# Patient Record
Sex: MALE | Race: WHITE | NOT HISPANIC OR LATINO | Employment: FULL TIME
[De-identification: names, ages, dates, MRNs, and addresses within clinical notes are randomized per-mention and may not be internally consistent; named-entity substitution may affect disease eponyms.]

---

## 2017-02-24 ENCOUNTER — HOSPITAL (OUTPATIENT)
Dept: OTHER | Age: 47
End: 2017-02-24
Attending: FAMILY MEDICINE

## 2017-02-24 LAB
A/G RATIO_: 1.3
ABS LYMPH: 2 K/CUMM (ref 1–3.5)
ABS MONO: 0.7 K/CUMM (ref 0.1–0.8)
ABS NEUTRO: 4 K/CUMM (ref 2–8)
ALBUMIN: 4.1 G/DL (ref 3.5–5)
ALK PHOS: 49 UNIT/L (ref 50–124)
ALT/GPT: 37 UNIT/L (ref 0–55)
ANION GAP SERPL CALC-SCNC: 14 MEQ/L (ref 10–20)
AST/GOT: 23 UNIT/L (ref 5–34)
BASOPHIL: 1 % (ref 0–1)
BILI TOTAL: 0.7 MG/DL (ref 0.2–1)
BUN SERPL-MCNC: 15 MG/DL (ref 6–20)
CALCIUM: 9.7 MG/DL (ref 8.4–10.2)
CHLORIDE: 104 MEQ/L (ref 97–107)
CHOLESTEROL: 186 MG/DL
CREATININE: 1.25 MG/DL (ref 0.6–1.3)
DIFF_TYPE?: NORMAL
EOSINOPHIL: 1 % (ref 0–6)
GLOBULIN_: 3.1 G/DL (ref 2–4.1)
GLUCOSE LVL: 103 MG/DL (ref 70–99)
HCT VFR BLD CALC: 46 % (ref 36–51)
HDLC SERPL-MCNC: 57 MG/DL (ref 40–60)
HEMOLYSIS 1+: NEGATIVE
HEMOLYSIS 2+: NEGATIVE
HGB BLD-MCNC: 15.8 G/DL (ref 12–17)
IMMATURE GRAN: 0.1 % (ref 0–0.3)
INSTR WBC: 6.8 K/CUMM (ref 4–11)
LDLC SERPL CALC-MCNC: 114 MG/DL
LIPEMIC 3+: NEGATIVE
LYMPHOCYTE: 29 %
MCH RBC QN AUTO: 32 PG (ref 25–35)
MCHC RBC AUTO-ENTMCNC: 34 G/DL (ref 32–37)
MCV RBC AUTO: 94 FL (ref 78–97)
MONOCYTE: 10 %
NEUTROPHIL: 58 %
NRBC BLD MANUAL-RTO: 0 % (ref 0–0.2)
PLATELET: 306 K/CUMM (ref 150–450)
POTASSIUM: 4.8 MEQ/L (ref 3.5–5.1)
RBC # BLD: 4.87 M/CUMM (ref 4.2–6)
RDW: 12.4 % (ref 11.5–14.5)
SODIUM: 138 MEQ/L (ref 136–145)
TCO2: 25 MEQ/L (ref 19–29)
TOTAL PROTEIN: 7.2 G/DL (ref 6.4–8.3)
TRIGL SERPL-MCNC: 76 MG/DL
TSH SERPL-ACNC: 1.28 MIU/ML (ref 0.4–5)
UA APPEAR: CLEAR
UA BILI: NEGATIVE
UA BLOOD: NEGATIVE
UA COLOR: YELLOW
UA GLUCOSE: NEGATIVE
UA KETONES: NEGATIVE
UA LEUK EST: NEGATIVE
UA NITRITE: NEGATIVE
UA PH: 6 (ref 5–7)
UA PROTEIN: NEGATIVE
UA SPEC GRAV: <=1.005 (ref 1.01–1.02)
UA UROBILINOGEN: 0.2 MG/DL (ref 0.2–1)
VIT D 25 OH LVL: 30.3 NG/ML
WBC # BLD: 6.8 K/CUMM (ref 4–11)

## 2018-09-07 ENCOUNTER — HOSPITAL (OUTPATIENT)
Dept: OTHER | Age: 48
End: 2018-09-07
Attending: FAMILY MEDICINE

## 2018-09-07 LAB
A/G RATIO_: 1.2
ABS LYMPH: 1.9 K/CUMM (ref 1–3.5)
ABS MONO: 0.6 K/CUMM (ref 0.1–0.8)
ABS NEUTRO: 3.8 K/CUMM (ref 2–8)
ALBUMIN: 4.1 G/DL (ref 3.5–5)
ALK PHOS: 50 UNIT/L (ref 50–124)
ALT/GPT: 25 UNIT/L (ref 0–55)
ANION GAP SERPL CALC-SCNC: 15 MEQ/L (ref 10–20)
AST/GOT: 21 UNIT/L (ref 5–34)
BASOPHIL: 1 % (ref 0–1)
BILI TOTAL: 0.8 MG/DL (ref 0.2–1)
BUN SERPL-MCNC: 15 MG/DL (ref 6–20)
CALCIUM: 9.6 MG/DL (ref 8.4–10.2)
CHLORIDE: 105 MEQ/L (ref 97–107)
CHOLESTEROL: 200 MG/DL
CREATININE: 1.19 MG/DL (ref 0.6–1.3)
DIFF_TYPE?: NORMAL
EOSINOPHIL: 2 % (ref 0–6)
GLOBULIN_: 3.5 G/DL (ref 2–4.1)
GLUCOSE LVL: 104 MG/DL (ref 70–99)
HCT VFR BLD CALC: 47 % (ref 36–51)
HDLC SERPL-MCNC: 64 MG/DL (ref 40–60)
HEMOLYSIS 1+: NEGATIVE
HGB BLD-MCNC: 16.5 G/DL (ref 12–17)
IMMATURE GRAN: 0.3 % (ref 0–0.3)
INSTR WBC: 6.5 K/CUMM (ref 4–11)
LDLC SERPL CALC-MCNC: 123 MG/DL
LYMPHOCYTE: 30 %
MCH RBC QN AUTO: 33 PG (ref 25–35)
MCHC RBC AUTO-ENTMCNC: 35 G/DL (ref 32–37)
MCV RBC AUTO: 95 FL (ref 78–97)
MONOCYTE: 9 %
NEUTROPHIL: 59 %
NRBC BLD MANUAL-RTO: 0 % (ref 0–0.2)
PLATELET: 298 K/CUMM (ref 150–450)
POTASSIUM: 5.1 MEQ/L (ref 3.5–5.1)
RBC # BLD: 4.96 M/CUMM (ref 4.2–6)
RDW: 12.6 % (ref 11.5–14.5)
SODIUM: 138 MEQ/L (ref 136–145)
T3 FREE LVL: 3 PG/ML (ref 1.8–4.2)
TCO2: 23 MEQ/L (ref 19–29)
TOTAL PROTEIN: 7.6 G/DL (ref 6.4–8.3)
TRIGL SERPL-MCNC: 65 MG/DL
TSH SERPL-ACNC: 1.2 MIU/ML (ref 0.4–5)
UA APPEAR: CLEAR
UA BILI: NEGATIVE
UA BLOOD: NEGATIVE
UA COLOR: YELLOW
UA GLUCOSE: NEGATIVE
UA KETONES: NEGATIVE
UA LEUK EST: NEGATIVE
UA NITRITE: NEGATIVE
UA PH: 7.5 (ref 5–7)
UA PROTEIN: NEGATIVE
UA SPEC GRAV: 1.01 (ref 1.01–1.02)
UA UROBILINOGEN: 0.2 MG/DL (ref 0.2–1)
VIT D 25 OH LVL: 45.2 NG/ML
WBC # BLD: 6.5 K/CUMM (ref 4–11)

## 2019-02-07 ENCOUNTER — HOSPITAL (OUTPATIENT)
Dept: OTHER | Age: 49
End: 2019-02-07
Attending: PHYSICIAN ASSISTANT

## 2021-05-06 ENCOUNTER — OFFICE VISIT (OUTPATIENT)
Dept: FAMILY MEDICINE CLINIC | Facility: CLINIC | Age: 51
End: 2021-05-06
Payer: COMMERCIAL

## 2021-05-06 ENCOUNTER — LAB ENCOUNTER (OUTPATIENT)
Dept: LAB | Age: 51
End: 2021-05-06
Attending: FAMILY MEDICINE
Payer: COMMERCIAL

## 2021-05-06 VITALS
TEMPERATURE: 98 F | WEIGHT: 238 LBS | DIASTOLIC BLOOD PRESSURE: 86 MMHG | HEIGHT: 71.5 IN | RESPIRATION RATE: 16 BRPM | BODY MASS INDEX: 32.59 KG/M2 | SYSTOLIC BLOOD PRESSURE: 132 MMHG | HEART RATE: 68 BPM

## 2021-05-06 DIAGNOSIS — Z13.29 SCREENING FOR ENDOCRINE, NUTRITIONAL, METABOLIC AND IMMUNITY DISORDER: ICD-10-CM

## 2021-05-06 DIAGNOSIS — R53.83 FATIGUE, UNSPECIFIED TYPE: ICD-10-CM

## 2021-05-06 DIAGNOSIS — Z13.0 SCREENING FOR DEFICIENCY ANEMIA: ICD-10-CM

## 2021-05-06 DIAGNOSIS — R73.9 HYPERGLYCEMIA: ICD-10-CM

## 2021-05-06 DIAGNOSIS — Z13.89 SCREENING FOR GENITOURINARY CONDITION: ICD-10-CM

## 2021-05-06 DIAGNOSIS — Z12.5 SCREENING FOR PROSTATE CANCER: ICD-10-CM

## 2021-05-06 DIAGNOSIS — Z13.21 SCREENING FOR ENDOCRINE, NUTRITIONAL, METABOLIC AND IMMUNITY DISORDER: ICD-10-CM

## 2021-05-06 DIAGNOSIS — Z13.220 SCREENING FOR LIPOID DISORDERS: ICD-10-CM

## 2021-05-06 DIAGNOSIS — Z00.00 ANNUAL PHYSICAL EXAM: Primary | ICD-10-CM

## 2021-05-06 DIAGNOSIS — Z13.228 SCREENING FOR ENDOCRINE, NUTRITIONAL, METABOLIC AND IMMUNITY DISORDER: ICD-10-CM

## 2021-05-06 DIAGNOSIS — Z13.0 SCREENING FOR ENDOCRINE, NUTRITIONAL, METABOLIC AND IMMUNITY DISORDER: ICD-10-CM

## 2021-05-06 PROCEDURE — 84153 ASSAY OF PSA TOTAL: CPT | Performed by: FAMILY MEDICINE

## 2021-05-06 PROCEDURE — 84439 ASSAY OF FREE THYROXINE: CPT | Performed by: FAMILY MEDICINE

## 2021-05-06 PROCEDURE — 3079F DIAST BP 80-89 MM HG: CPT | Performed by: FAMILY MEDICINE

## 2021-05-06 PROCEDURE — 84403 ASSAY OF TOTAL TESTOSTERONE: CPT | Performed by: FAMILY MEDICINE

## 2021-05-06 PROCEDURE — 80061 LIPID PANEL: CPT | Performed by: FAMILY MEDICINE

## 2021-05-06 PROCEDURE — 3008F BODY MASS INDEX DOCD: CPT | Performed by: FAMILY MEDICINE

## 2021-05-06 PROCEDURE — 99386 PREV VISIT NEW AGE 40-64: CPT | Performed by: FAMILY MEDICINE

## 2021-05-06 PROCEDURE — 83036 HEMOGLOBIN GLYCOSYLATED A1C: CPT | Performed by: FAMILY MEDICINE

## 2021-05-06 PROCEDURE — 80053 COMPREHEN METABOLIC PANEL: CPT | Performed by: FAMILY MEDICINE

## 2021-05-06 PROCEDURE — 3075F SYST BP GE 130 - 139MM HG: CPT | Performed by: FAMILY MEDICINE

## 2021-05-06 NOTE — PROGRESS NOTES
Chloe Muro is a 45year old male who presents for a complete physical exam.   HPI:   Pt the patient is a new patient to our practice. He has been referred by a close friend. States he has been fairly healthy.   He is most concerned about a persis Used      Tobacco comment: Non-Smoker    Alcohol use: No    Drug use: Not on file     Occ: [place]. 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 Children: 3. Exercise: three times per week.   Diet: watches minimally    REVIEW OF SYSTEMS:   GENERAL: feels well otherwise  SKIN: denies an unspecified type  Screening for deficiency anemia  Screening for endocrine, nutritional, metabolic and immunity disorder  Screening for prostate cancer  Screening for genitourinary condition  Screening for lipoid disorders    Orders Placed This Encounter

## 2021-05-10 ENCOUNTER — PATIENT MESSAGE (OUTPATIENT)
Dept: FAMILY MEDICINE CLINIC | Facility: CLINIC | Age: 51
End: 2021-05-10

## 2021-05-10 DIAGNOSIS — R73.09 ELEVATED GLUCOSE: Primary | ICD-10-CM

## 2021-05-10 DIAGNOSIS — E78.00 ELEVATED CHOLESTEROL: ICD-10-CM

## 2021-05-10 NOTE — TELEPHONE ENCOUNTER
From: Darrin Ko  To: Capri Flowers MD  Sent: 5/10/2021 11:45 AM CDT  Subject: Test Results Question    Good Morning: Thank you for your follow-up note. Did you by chance notice any vitamin deficiency? Anything I should focus on?  I started ta

## 2021-12-29 ENCOUNTER — TELEPHONE (OUTPATIENT)
Dept: FAMILY MEDICINE CLINIC | Facility: CLINIC | Age: 51
End: 2021-12-29

## 2021-12-29 NOTE — TELEPHONE ENCOUNTER
S/w pt. In [place] until tomorrow. Reports sob x 3-4 weeks. No cp. Said if he bends over at the waist will get \"chest tightness\"  No swelling anywhere. No fevers. occ cough. Said he tested neg for covid 2 days ago.   I advised he should go to urge

## 2021-12-29 NOTE — TELEPHONE ENCOUNTER
1. What are your symptoms? Shortness of breath when talking - leads to coughing, cannot talk for long without leading to coughing    2. How long have you been having these symptoms? 3-4 weeks    3.  Have you done anything already to treat your symptom

## 2021-12-30 ENCOUNTER — OFFICE VISIT (OUTPATIENT)
Dept: URGENT CARE | Age: 51
End: 2021-12-30

## 2021-12-30 VITALS
HEART RATE: 71 BPM | SYSTOLIC BLOOD PRESSURE: 140 MMHG | TEMPERATURE: 97.6 F | DIASTOLIC BLOOD PRESSURE: 88 MMHG | RESPIRATION RATE: 18 BRPM | OXYGEN SATURATION: 98 %

## 2021-12-30 DIAGNOSIS — J98.01 COUGH DUE TO BRONCHOSPASM: ICD-10-CM

## 2021-12-30 DIAGNOSIS — J40 BRONCHITIS: Primary | ICD-10-CM

## 2021-12-30 PROCEDURE — 99204 OFFICE O/P NEW MOD 45 MIN: CPT | Performed by: FAMILY MEDICINE

## 2021-12-30 RX ORDER — ALBUTEROL SULFATE 90 UG/1
AEROSOL, METERED RESPIRATORY (INHALATION)
Qty: 1 EACH | Refills: 0 | Status: SHIPPED | OUTPATIENT
Start: 2021-12-30

## 2021-12-30 RX ORDER — PREDNISONE 20 MG/1
40 TABLET ORAL DAILY
Qty: 10 TABLET | Refills: 0 | Status: SHIPPED | OUTPATIENT
Start: 2021-12-30 | End: 2022-01-04

## 2021-12-30 RX ORDER — DEXTROMETHORPHAN HYDROBROMIDE AND PROMETHAZINE HYDROCHLORIDE 15; 6.25 MG/5ML; MG/5ML
5 SYRUP ORAL
Qty: 120 ML | Refills: 0 | Status: SHIPPED | OUTPATIENT
Start: 2021-12-30 | End: 2022-01-09

## 2021-12-30 RX ORDER — AZITHROMYCIN 250 MG/1
TABLET, FILM COATED ORAL
Qty: 6 TABLET | Refills: 0 | Status: SHIPPED | OUTPATIENT
Start: 2021-12-30 | End: 2022-01-04

## 2022-01-12 RX ORDER — FLUTICASONE FUROATE 100 UG/1
1 POWDER RESPIRATORY (INHALATION) DAILY
Qty: 1 EACH | Refills: 0 | Status: SHIPPED | OUTPATIENT
Start: 2022-01-12

## 2022-01-12 NOTE — TELEPHONE ENCOUNTER
Have the patient start Arnuity 100 mcg 1 puff daily for the next 2 weeks. He can use an albuterol inhaler on an as-needed basis. Have him update me in 10 days.

## 2022-01-12 NOTE — TELEPHONE ENCOUNTER
S/w pt. Reports he felt some improvement when he was on pred/abx/inhaler from urgent care in [place] (see notes below when I s/w him 12.29)    sts he feels silly calling  Said he \"can't fully kick what this is\". Said he has very sl sob  occ cough.  Not like

## 2022-01-12 NOTE — TELEPHONE ENCOUNTER
Based on symptoms described by patient, it was determined that live call be transferred to a triage nurse for further evaluation. Pt still complaining of SOB but not as bad as before. Still has cough.     Went to  end of December and pt wasa prescribed

## 2022-06-21 ENCOUNTER — TELEPHONE (OUTPATIENT)
Dept: FAMILY MEDICINE CLINIC | Facility: CLINIC | Age: 52
End: 2022-06-21

## 2022-06-21 NOTE — TELEPHONE ENCOUNTER
Pt tested positive for Covid with a home test. Pt started having congestion and tiredness on Sunday evening.

## 2022-09-27 ENCOUNTER — TELEPHONE (OUTPATIENT)
Dept: FAMILY MEDICINE CLINIC | Facility: CLINIC | Age: 52
End: 2022-09-27

## 2022-09-27 NOTE — TELEPHONE ENCOUNTER
Since he finished the prednisone he can try Flonase 2 sprays each nostril twice daily for 5 days then once daily

## 2022-09-27 NOTE — TELEPHONE ENCOUNTER
S/w pt. Advised of below. He voiced understanding. I advised if sx's do not fully resolve--be evaluated here, we haven't seen him since 5/2021. He agrees.

## 2022-09-27 NOTE — TELEPHONE ENCOUNTER
Pt requesting to speak with nurse regarding ongoing cold symptom. Pt states he is ongoing 2 weeks of cold symptoms and states main concern/remaining symptom is lingering ringing and congestion/pressure in his left ear. Pt states he feels \"something\" moving in his ear every time he coughs/sneezes. States it's probably the fluid/congestion in his ear. Pt states he went to a clinic near his house (not an [facility]) last week and was prescribed prednisone and benzonatate. Pt has finished the prednisone yesterday and had 1-2 days left of benzonatate. Pt is hoping to discuss possibly getting an additional prescription to help alleviate pressure in his ear. Please advise.

## 2022-09-27 NOTE — TELEPHONE ENCOUNTER
Per pt he went to a [facility] outside of [address] [place] [address] for URI. Everything else a little better, except his B/L ears are still pressure, no pain, decrease hearing and ringing. Also PND. Denies any fever, SOB, CP. Sxs since last Tuesday. Pt wants to know if anything he can use for his ears. Per pt he was taking a nasal spray before and denied taking any decongestant. Was given prednisone and benzonatate.

## 2022-09-27 NOTE — TELEPHONE ENCOUNTER
The prednisone is probably the best thing he can be taking right now.   I would give it a little bit more time and see how he does

## 2022-09-29 ENCOUNTER — OFFICE VISIT (OUTPATIENT)
Dept: FAMILY MEDICINE CLINIC | Facility: CLINIC | Age: 52
End: 2022-09-29

## 2022-09-29 VITALS
DIASTOLIC BLOOD PRESSURE: 78 MMHG | HEART RATE: 75 BPM | WEIGHT: 240 LBS | TEMPERATURE: 99 F | RESPIRATION RATE: 14 BRPM | HEIGHT: 71.5 IN | OXYGEN SATURATION: 98 % | BODY MASS INDEX: 32.86 KG/M2 | SYSTOLIC BLOOD PRESSURE: 142 MMHG

## 2022-09-29 DIAGNOSIS — H65.02 NON-RECURRENT ACUTE SEROUS OTITIS MEDIA OF LEFT EAR: Primary | ICD-10-CM

## 2022-09-29 PROCEDURE — 3077F SYST BP >= 140 MM HG: CPT | Performed by: FAMILY MEDICINE

## 2022-09-29 PROCEDURE — 3008F BODY MASS INDEX DOCD: CPT | Performed by: FAMILY MEDICINE

## 2022-09-29 PROCEDURE — 3078F DIAST BP <80 MM HG: CPT | Performed by: FAMILY MEDICINE

## 2022-09-29 PROCEDURE — 99213 OFFICE O/P EST LOW 20 MIN: CPT | Performed by: FAMILY MEDICINE

## 2022-09-29 RX ORDER — METHYLPREDNISOLONE 4 MG/1
TABLET ORAL
Qty: 1 EACH | Refills: 0 | Status: SHIPPED | OUTPATIENT
Start: 2022-09-29

## 2022-09-29 RX ORDER — AMOXICILLIN AND CLAVULANATE POTASSIUM 875; 125 MG/1; MG/1
1 TABLET, FILM COATED ORAL 2 TIMES DAILY
Qty: 20 TABLET | Refills: 0 | Status: SHIPPED | OUTPATIENT
Start: 2022-09-29 | End: 2022-10-09

## 2022-09-29 NOTE — TELEPHONE ENCOUNTER
Live call from pt. Reports ear still feels blocked. Does not feel the Flonase is helping at all. Feels his hearing is being impacted. I suggested [facility] today for further evaluation as all providers full until monday. He agrees. Info provided for [email or web address] for locations. He agrees.

## 2022-10-11 ENCOUNTER — TELEPHONE (OUTPATIENT)
Dept: FAMILY MEDICINE CLINIC | Facility: CLINIC | Age: 52
End: 2022-10-11

## 2022-10-11 NOTE — TELEPHONE ENCOUNTER
Called Dr. Lauren Coronado office and confirmed that he can see patient for hearing loss. Pt informed.

## 2022-10-11 NOTE — TELEPHONE ENCOUNTER
Pt scheduled an appt w/Dr. Jun Elias on 10/24 for hearing loss. Wants to make sure this is the correct specialist to see for hearing loss. Physical scheduled for 12/19.

## 2022-10-24 ENCOUNTER — OFFICE VISIT (OUTPATIENT)
Facility: LOCATION | Age: 52
End: 2022-10-24
Payer: COMMERCIAL

## 2022-10-24 DIAGNOSIS — H90.3 SENSORINEURAL HEARING LOSS (SNHL) OF BOTH EARS: Primary | ICD-10-CM

## 2022-10-24 PROCEDURE — 92557 COMPREHENSIVE HEARING TEST: CPT | Performed by: AUDIOLOGIST

## 2022-10-24 PROCEDURE — 92567 TYMPANOMETRY: CPT | Performed by: AUDIOLOGIST

## 2022-10-24 PROCEDURE — 99204 OFFICE O/P NEW MOD 45 MIN: CPT | Performed by: OTOLARYNGOLOGY

## 2022-10-24 NOTE — PROGRESS NOTES
Kennedy Robles was seen for an audiometric evaluation and tympanogram today. Referred back to physician. Recommend demo/trial with hearing aids.     Rose Medina MS, CCC-A

## 2022-12-19 ENCOUNTER — OFFICE VISIT (OUTPATIENT)
Dept: FAMILY MEDICINE CLINIC | Facility: CLINIC | Age: 52
End: 2022-12-19
Payer: COMMERCIAL

## 2022-12-19 VITALS
HEIGHT: 71.5 IN | BODY MASS INDEX: 34.51 KG/M2 | DIASTOLIC BLOOD PRESSURE: 92 MMHG | RESPIRATION RATE: 16 BRPM | SYSTOLIC BLOOD PRESSURE: 146 MMHG | WEIGHT: 252 LBS | TEMPERATURE: 98 F | HEART RATE: 76 BPM

## 2022-12-19 DIAGNOSIS — Z13.21 SCREENING FOR ENDOCRINE, NUTRITIONAL, METABOLIC AND IMMUNITY DISORDER: ICD-10-CM

## 2022-12-19 DIAGNOSIS — Z13.89 SCREENING FOR GENITOURINARY CONDITION: ICD-10-CM

## 2022-12-19 DIAGNOSIS — Z12.11 SCREENING FOR COLON CANCER: ICD-10-CM

## 2022-12-19 DIAGNOSIS — Z13.29 SCREENING FOR ENDOCRINE, NUTRITIONAL, METABOLIC AND IMMUNITY DISORDER: ICD-10-CM

## 2022-12-19 DIAGNOSIS — Z00.00 ANNUAL PHYSICAL EXAM: Primary | ICD-10-CM

## 2022-12-19 DIAGNOSIS — Z13.0 SCREENING FOR DEFICIENCY ANEMIA: ICD-10-CM

## 2022-12-19 DIAGNOSIS — Z13.220 SCREENING FOR LIPOID DISORDERS: ICD-10-CM

## 2022-12-19 DIAGNOSIS — Z12.5 SCREENING FOR PROSTATE CANCER: ICD-10-CM

## 2022-12-19 DIAGNOSIS — Z13.228 SCREENING FOR ENDOCRINE, NUTRITIONAL, METABOLIC AND IMMUNITY DISORDER: ICD-10-CM

## 2022-12-19 DIAGNOSIS — Z13.0 SCREENING FOR ENDOCRINE, NUTRITIONAL, METABOLIC AND IMMUNITY DISORDER: ICD-10-CM

## 2022-12-19 PROCEDURE — 3077F SYST BP >= 140 MM HG: CPT | Performed by: FAMILY MEDICINE

## 2022-12-19 PROCEDURE — 3080F DIAST BP >= 90 MM HG: CPT | Performed by: FAMILY MEDICINE

## 2022-12-19 PROCEDURE — 99396 PREV VISIT EST AGE 40-64: CPT | Performed by: FAMILY MEDICINE

## 2022-12-19 PROCEDURE — 3008F BODY MASS INDEX DOCD: CPT | Performed by: FAMILY MEDICINE

## 2023-01-10 ENCOUNTER — TELEPHONE (OUTPATIENT)
Dept: FAMILY MEDICINE CLINIC | Facility: CLINIC | Age: 53
End: 2023-01-10

## 2023-01-10 DIAGNOSIS — M25.561 RIGHT KNEE PAIN, UNSPECIFIED CHRONICITY: ICD-10-CM

## 2023-01-10 DIAGNOSIS — M25.551 RIGHT HIP PAIN: Primary | ICD-10-CM

## 2023-01-10 NOTE — TELEPHONE ENCOUNTER
S/W pt and he states he is fine going through [facility] PT.  But will call his insurance for [employer] if need to.

## 2023-01-10 NOTE — TELEPHONE ENCOUNTER
Pt is looking for a referral for a physical therapy. Pt spoke with Dr Immanuel Sims at his last [address] 12/19 about physical therapy and a chiropractor. Pt currently is going to a chiropractor but is looking for a physical therapist.    Pt is preferring [facility] physical therapy near [facility] if possible.     Please advise

## 2023-01-12 ENCOUNTER — TELEPHONE (OUTPATIENT)
Dept: FAMILY MEDICINE CLINIC | Facility: CLINIC | Age: 53
End: 2023-01-12

## 2023-01-12 RX ORDER — METHYLPREDNISOLONE 4 MG/1
TABLET ORAL
Qty: 21 TABLET | Refills: 0 | Status: SHIPPED | OUTPATIENT
Start: 2023-01-12

## 2023-01-12 NOTE — TELEPHONE ENCOUNTER
.  Most insurance plans require a course of physical therapy before they will  okay an MRI.   Patient could try a Medrol Dosepak

## 2023-01-12 NOTE — TELEPHONE ENCOUNTER
Pt called back and informed of Dr. Miguelina Simms recommendation below and he voiced understanding. Medrol Dose Alcon sent to his [facility].

## 2023-01-12 NOTE — TELEPHONE ENCOUNTER
S/w Consuelo Luo     Pt inquiring about PT referral which was entered on 1/10/23 but pending insurance authorization. Pt has pain behind right knee and right buttocks/hip. Pt also inquiring if should get MRI in addition to PT? Pt stated can only tolerate sitting for 20 minutes at a time he has flights for work going to [place] and back to [facility]. 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 Informed of signs and symptoms to go to the ED. Pt is inquiring if there is anything he can take to tolerate this trip on 1/16/23 ? no rashes

## 2023-01-12 NOTE — TELEPHONE ENCOUNTER
Pt requesting to speak with nurse regarding ongoing discussion about getting PT referral. Pt states he has not yet received notification of referral being placed. I did advised pt it appears referral is in system however not yet authorized by insurance. Pt also looking to discuss upcoming travel next week. 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 He is hoping to discuss recommendations as to how to travel comfortably. Please contact to discuss.

## 2023-01-24 ENCOUNTER — TELEPHONE (OUTPATIENT)
Dept: PHYSICAL THERAPY | Facility: HOSPITAL | Age: 53
End: 2023-01-24

## 2023-01-24 ENCOUNTER — OFFICE VISIT (OUTPATIENT)
Dept: PHYSICAL THERAPY | Age: 53
End: 2023-01-24
Attending: FAMILY MEDICINE
Payer: COMMERCIAL

## 2023-01-24 DIAGNOSIS — M25.561 RIGHT KNEE PAIN, UNSPECIFIED CHRONICITY: ICD-10-CM

## 2023-01-24 DIAGNOSIS — M25.551 RIGHT HIP PAIN: ICD-10-CM

## 2023-01-24 PROCEDURE — 97110 THERAPEUTIC EXERCISES: CPT

## 2023-01-24 PROCEDURE — 97161 PT EVAL LOW COMPLEX 20 MIN: CPT

## 2023-01-30 ENCOUNTER — OFFICE VISIT (OUTPATIENT)
Dept: PHYSICAL THERAPY | Age: 53
End: 2023-01-30
Attending: FAMILY MEDICINE
Payer: COMMERCIAL

## 2023-01-30 PROCEDURE — 97110 THERAPEUTIC EXERCISES: CPT

## 2023-02-02 ENCOUNTER — OFFICE VISIT (OUTPATIENT)
Dept: PHYSICAL THERAPY | Age: 53
End: 2023-02-02
Attending: FAMILY MEDICINE
Payer: COMMERCIAL

## 2023-02-02 PROCEDURE — 97140 MANUAL THERAPY 1/> REGIONS: CPT

## 2023-02-02 PROCEDURE — 97110 THERAPEUTIC EXERCISES: CPT

## 2023-02-06 ENCOUNTER — APPOINTMENT (OUTPATIENT)
Dept: PHYSICAL THERAPY | Age: 53
End: 2023-02-06
Attending: FAMILY MEDICINE
Payer: COMMERCIAL

## 2023-02-13 ENCOUNTER — OFFICE VISIT (OUTPATIENT)
Dept: PHYSICAL THERAPY | Age: 53
End: 2023-02-13
Attending: FAMILY MEDICINE
Payer: COMMERCIAL

## 2023-02-13 PROCEDURE — 97110 THERAPEUTIC EXERCISES: CPT

## 2023-02-13 PROCEDURE — 97530 THERAPEUTIC ACTIVITIES: CPT

## 2023-02-15 ENCOUNTER — OFFICE VISIT (OUTPATIENT)
Dept: PHYSICAL THERAPY | Age: 53
End: 2023-02-15
Attending: FAMILY MEDICINE
Payer: COMMERCIAL

## 2023-02-15 PROCEDURE — 97110 THERAPEUTIC EXERCISES: CPT

## 2023-02-20 ENCOUNTER — APPOINTMENT (OUTPATIENT)
Dept: PHYSICAL THERAPY | Age: 53
End: 2023-02-20
Attending: FAMILY MEDICINE
Payer: COMMERCIAL

## 2023-02-20 ENCOUNTER — OFFICE VISIT (OUTPATIENT)
Dept: PHYSICAL THERAPY | Age: 53
End: 2023-02-20
Attending: FAMILY MEDICINE
Payer: COMMERCIAL

## 2023-02-20 PROCEDURE — 97140 MANUAL THERAPY 1/> REGIONS: CPT

## 2023-02-20 PROCEDURE — 97110 THERAPEUTIC EXERCISES: CPT

## 2023-02-22 ENCOUNTER — OFFICE VISIT (OUTPATIENT)
Dept: PHYSICAL THERAPY | Age: 53
End: 2023-02-22
Attending: FAMILY MEDICINE
Payer: COMMERCIAL

## 2023-02-22 PROCEDURE — 97140 MANUAL THERAPY 1/> REGIONS: CPT

## 2023-02-22 PROCEDURE — 97110 THERAPEUTIC EXERCISES: CPT

## 2023-02-27 ENCOUNTER — OFFICE VISIT (OUTPATIENT)
Dept: PHYSICAL THERAPY | Age: 53
End: 2023-02-27
Attending: FAMILY MEDICINE
Payer: COMMERCIAL

## 2023-02-27 PROCEDURE — 97140 MANUAL THERAPY 1/> REGIONS: CPT

## 2023-02-27 PROCEDURE — 97110 THERAPEUTIC EXERCISES: CPT

## 2023-05-10 ENCOUNTER — OFFICE VISIT (OUTPATIENT)
Dept: FAMILY MEDICINE CLINIC | Facility: CLINIC | Age: 53
End: 2023-05-10
Payer: COMMERCIAL

## 2023-05-10 VITALS
OXYGEN SATURATION: 97 % | TEMPERATURE: 98 F | SYSTOLIC BLOOD PRESSURE: 168 MMHG | HEART RATE: 82 BPM | RESPIRATION RATE: 18 BRPM | DIASTOLIC BLOOD PRESSURE: 90 MMHG

## 2023-05-10 DIAGNOSIS — R03.0 ELEVATED BLOOD PRESSURE READING: ICD-10-CM

## 2023-05-10 DIAGNOSIS — H92.02 LEFT EAR PAIN: Primary | ICD-10-CM

## 2023-05-10 PROCEDURE — 3080F DIAST BP >= 90 MM HG: CPT | Performed by: NURSE PRACTITIONER

## 2023-05-10 PROCEDURE — 99213 OFFICE O/P EST LOW 20 MIN: CPT | Performed by: NURSE PRACTITIONER

## 2023-05-10 PROCEDURE — 3077F SYST BP >= 140 MM HG: CPT | Performed by: NURSE PRACTITIONER

## 2023-05-30 ENCOUNTER — TELEPHONE (OUTPATIENT)
Dept: FAMILY MEDICINE CLINIC | Facility: CLINIC | Age: 53
End: 2023-05-30

## 2023-05-30 NOTE — TELEPHONE ENCOUNTER
I called and spoke to pt. He has had elevated blood pressure readings recently. 12/19/2022 in office reading was 152/100 repeat in office was 146/92  [facility] 05/10/2023 168/90  Today 160/95. He denies any chest pain or pressure. No headache or vision changes. He does occasionally have some slight shortness of breath with exertion. He is a non smoker. Family hx dad MI at approx age 77. Dad HTN. Mom hx of elevated cholesterol. Pt is calling today to make an appt for an [facility].  Please advise

## 2023-05-30 NOTE — TELEPHONE ENCOUNTER
Sounds like patient needs a hypertension visit. If I have an opening I would be happy to see him if not okay to schedule with whomever has an opening.   Does he need the number to schedule the U?  S

## 2023-05-30 NOTE — TELEPHONE ENCOUNTER
I called and spoke to pt. I made him an appt to see MICHELLE Hawkins PA-C on 06/01 at 11:00 AM. Pt is aware if he develops any shortness of breath, chest pressure, vision changes, severe headache to go to the ER.  Pt. Agreed to plan and verbalized understanding

## 2023-05-30 NOTE — TELEPHONE ENCOUNTER
Pt requesting to s/w nurse regarding high BP.    LOV w/ Dr Shen Griggs - 152/100  [facility] 5/10 - 168/90  At home 5/30 - 160/95    Pt concerned as BP does not seem to be lowering on its own, requesting to discuss w/ nurse. Pt agrees to c/b w/ new / worsening symptoms and/or higher BP readings.

## 2023-06-01 ENCOUNTER — OFFICE VISIT (OUTPATIENT)
Dept: FAMILY MEDICINE CLINIC | Facility: CLINIC | Age: 53
End: 2023-06-01
Payer: COMMERCIAL

## 2023-06-01 VITALS
SYSTOLIC BLOOD PRESSURE: 142 MMHG | BODY MASS INDEX: 34.44 KG/M2 | HEART RATE: 80 BPM | DIASTOLIC BLOOD PRESSURE: 78 MMHG | TEMPERATURE: 97 F | RESPIRATION RATE: 16 BRPM | WEIGHT: 246 LBS | HEIGHT: 71 IN

## 2023-06-01 DIAGNOSIS — I10 UNCONTROLLED HYPERTENSION: Primary | ICD-10-CM

## 2023-06-01 PROCEDURE — 99214 OFFICE O/P EST MOD 30 MIN: CPT | Performed by: FAMILY MEDICINE

## 2023-06-01 PROCEDURE — 3077F SYST BP >= 140 MM HG: CPT | Performed by: FAMILY MEDICINE

## 2023-06-01 PROCEDURE — 3078F DIAST BP <80 MM HG: CPT | Performed by: FAMILY MEDICINE

## 2023-06-01 PROCEDURE — 3008F BODY MASS INDEX DOCD: CPT | Performed by: FAMILY MEDICINE

## 2023-06-01 RX ORDER — VALSARTAN 80 MG/1
80 TABLET ORAL DAILY
Qty: 30 TABLET | Refills: 1 | Status: SHIPPED | OUTPATIENT
Start: 2023-06-01

## 2023-06-01 NOTE — PATIENT INSTRUCTIONS
Check fasting labs from Dr. Jose Amezquita. Check BP every other day starting in about 4 weeks. Follow up in 6 weeks. Bring readings and monitor to the appointment.

## 2023-07-13 ENCOUNTER — OFFICE VISIT (OUTPATIENT)
Dept: FAMILY MEDICINE CLINIC | Facility: CLINIC | Age: 53
End: 2023-07-13
Payer: COMMERCIAL

## 2023-07-13 VITALS
HEART RATE: 64 BPM | SYSTOLIC BLOOD PRESSURE: 118 MMHG | HEIGHT: 71 IN | DIASTOLIC BLOOD PRESSURE: 74 MMHG | BODY MASS INDEX: 33.88 KG/M2 | WEIGHT: 242 LBS | RESPIRATION RATE: 12 BRPM

## 2023-07-13 DIAGNOSIS — Z12.12 SCREENING FOR COLORECTAL CANCER: ICD-10-CM

## 2023-07-13 DIAGNOSIS — Z12.11 SCREENING FOR COLORECTAL CANCER: ICD-10-CM

## 2023-07-13 DIAGNOSIS — I10 BENIGN ESSENTIAL HYPERTENSION: Primary | ICD-10-CM

## 2023-07-13 PROCEDURE — 3008F BODY MASS INDEX DOCD: CPT | Performed by: FAMILY MEDICINE

## 2023-07-13 PROCEDURE — 3078F DIAST BP <80 MM HG: CPT | Performed by: FAMILY MEDICINE

## 2023-07-13 PROCEDURE — 99213 OFFICE O/P EST LOW 20 MIN: CPT | Performed by: FAMILY MEDICINE

## 2023-07-13 PROCEDURE — 3074F SYST BP LT 130 MM HG: CPT | Performed by: FAMILY MEDICINE

## 2023-07-13 RX ORDER — VALSARTAN 80 MG/1
80 TABLET ORAL DAILY
Qty: 90 TABLET | Refills: 1 | Status: SHIPPED | OUTPATIENT
Start: 2023-07-13

## 2023-08-30 ENCOUNTER — PATIENT MESSAGE (OUTPATIENT)
Dept: FAMILY MEDICINE CLINIC | Facility: CLINIC | Age: 53
End: 2023-08-30

## 2023-08-30 RX ORDER — VALSARTAN 80 MG/1
80 TABLET ORAL DAILY
Qty: 90 TABLET | Refills: 1 | Status: CANCELLED | OUTPATIENT
Start: 2023-08-30

## 2023-08-31 RX ORDER — VALSARTAN 80 MG/1
80 TABLET ORAL DAILY
Qty: 90 TABLET | Refills: 1 | Status: SHIPPED | OUTPATIENT
Start: 2023-08-31

## 2023-10-02 ENCOUNTER — HOSPITAL ENCOUNTER (OUTPATIENT)
Age: 53
Discharge: HOME | End: 2023-10-02
Payer: COMMERCIAL

## 2023-10-02 VITALS
DIASTOLIC BLOOD PRESSURE: 85 MMHG | SYSTOLIC BLOOD PRESSURE: 131 MMHG | TEMPERATURE: 100 F | HEART RATE: 104 BPM | OXYGEN SATURATION: 96 % | RESPIRATION RATE: 16 BRPM

## 2023-10-02 DIAGNOSIS — R05.1 ACUTE COUGH: Primary | ICD-10-CM

## 2023-10-02 DIAGNOSIS — J06.9 UPPER RESPIRATORY TRACT INFECTION, UNSPECIFIED TYPE: ICD-10-CM

## 2023-10-02 LAB — SARS-COV-2 RNA RESP QL NAA+PROBE: NOT DETECTED

## 2023-10-02 PROCEDURE — 99213 OFFICE O/P EST LOW 20 MIN: CPT | Performed by: NURSE PRACTITIONER

## 2023-10-02 PROCEDURE — U0002 COVID-19 LAB TEST NON-CDC: HCPCS | Performed by: NURSE PRACTITIONER

## 2023-10-02 RX ORDER — BENZONATATE 100 MG/1
100 CAPSULE ORAL 3 TIMES DAILY PRN
Qty: 30 CAPSULE | Refills: 0 | Status: SHIPPED | OUTPATIENT
Start: 2023-10-02 | End: 2023-11-01

## 2023-10-02 RX ORDER — IBUPROFEN 600 MG/1
600 TABLET ORAL ONCE
Status: COMPLETED | OUTPATIENT
Start: 2023-10-02 | End: 2023-10-02

## 2023-10-02 NOTE — DISCHARGE INSTRUCTIONS
-Acetaminophen  1000mg orally every 4 hours as needed for fever or pain. Do not combine with other products containing acetaminophen.      -Ibuprofen 600mg orally every 6 hours as needed for fever or pain. Take with food. Discontinue use and seek medical attention with blood in vomit or stool, coffee ground vomit, or dark black stool.     -Encourage fluids.     -Use a cool air humidifier as needed. -May administer 5 mL of organic honey as needed for cough in patients greater than 1 year of age. Do not administer over-the-counter cough and cold medications without discussing with your physician or the pharmacist as they may not be safe in children.     -Follow-up with a primary care provider within 3 days if fever persists or sooner if necessary.     -Please monitor for and seek medical attention with persistent fever, difficulties breathing, increased work of breathing, abdominal pain, persistent vomiting with concerns for dehydration, or any other concerns.

## 2023-10-13 ENCOUNTER — TELEPHONE (OUTPATIENT)
Dept: FAMILY MEDICINE CLINIC | Facility: CLINIC | Age: 53
End: 2023-10-13

## 2023-10-13 NOTE — TELEPHONE ENCOUNTER
1. What are your symptoms? Mostly just cough and congestion  Spitting up mucus  SOB  When trying to breathe out it promotes cough    Tested negative for COVID on 10/2 at [facility].  unable to perform flu test due to them being out of stock. 2. How long have you been having these symptoms? Monday, 10/02    3. Have you done anything already to treat your symptoms? Aleve cold and flu  Cough suppressant     ADDITIONAL INFO:     Last Friday- started feeling better. Went out and watched hs football game when raining and cold and seemes to set pt back. Week after the game, started to feel a little better but expereincing congestion and mucus.

## 2023-10-13 NOTE — TELEPHONE ENCOUNTER
S/w pt. No wheezing  Reports coughing fits when speaking  Feels he is on tail end but feels the cough is still lingering. Appt given for Monday  Advised if sx's worsen prior--urgent care this weekend.   He voiced understanding

## 2023-10-16 ENCOUNTER — OFFICE VISIT (OUTPATIENT)
Dept: FAMILY MEDICINE CLINIC | Facility: CLINIC | Age: 53
End: 2023-10-16
Payer: COMMERCIAL

## 2023-10-16 VITALS
SYSTOLIC BLOOD PRESSURE: 130 MMHG | DIASTOLIC BLOOD PRESSURE: 84 MMHG | BODY MASS INDEX: 33.32 KG/M2 | OXYGEN SATURATION: 95 % | HEART RATE: 80 BPM | HEIGHT: 71 IN | TEMPERATURE: 97 F | WEIGHT: 238 LBS | RESPIRATION RATE: 12 BRPM

## 2023-10-16 DIAGNOSIS — J06.9 ACUTE URI: ICD-10-CM

## 2023-10-16 DIAGNOSIS — R05.1 ACUTE COUGH: Primary | ICD-10-CM

## 2023-10-16 PROCEDURE — 3079F DIAST BP 80-89 MM HG: CPT | Performed by: FAMILY MEDICINE

## 2023-10-16 PROCEDURE — 99213 OFFICE O/P EST LOW 20 MIN: CPT | Performed by: FAMILY MEDICINE

## 2023-10-16 PROCEDURE — 3008F BODY MASS INDEX DOCD: CPT | Performed by: FAMILY MEDICINE

## 2023-10-16 PROCEDURE — 3075F SYST BP GE 130 - 139MM HG: CPT | Performed by: FAMILY MEDICINE

## 2023-10-16 RX ORDER — ALBUTEROL SULFATE 90 UG/1
AEROSOL, METERED RESPIRATORY (INHALATION)
Qty: 1 EACH | Refills: 0 | Status: SHIPPED | OUTPATIENT
Start: 2023-10-16

## 2023-10-16 RX ORDER — AZITHROMYCIN 250 MG/1
TABLET, FILM COATED ORAL
Qty: 6 TABLET | Refills: 0 | Status: SHIPPED | OUTPATIENT
Start: 2023-10-16 | End: 2023-10-20

## 2024-05-01 ENCOUNTER — OFFICE VISIT (OUTPATIENT)
Dept: FAMILY MEDICINE CLINIC | Facility: CLINIC | Age: 54
End: 2024-05-01
Payer: COMMERCIAL

## 2024-05-01 VITALS
HEIGHT: 71 IN | DIASTOLIC BLOOD PRESSURE: 72 MMHG | TEMPERATURE: 98 F | RESPIRATION RATE: 18 BRPM | HEART RATE: 74 BPM | WEIGHT: 245 LBS | BODY MASS INDEX: 34.3 KG/M2 | SYSTOLIC BLOOD PRESSURE: 118 MMHG

## 2024-05-01 DIAGNOSIS — Z12.11 SCREEN FOR COLON CANCER: ICD-10-CM

## 2024-05-01 DIAGNOSIS — Z00.00 HEALTH MAINTENANCE EXAMINATION: Primary | ICD-10-CM

## 2024-05-01 DIAGNOSIS — Z12.5 SCREENING FOR MALIGNANT NEOPLASM OF PROSTATE: ICD-10-CM

## 2024-05-01 PROCEDURE — 3074F SYST BP LT 130 MM HG: CPT | Performed by: FAMILY MEDICINE

## 2024-05-01 PROCEDURE — 3078F DIAST BP <80 MM HG: CPT | Performed by: FAMILY MEDICINE

## 2024-05-01 PROCEDURE — 99396 PREV VISIT EST AGE 40-64: CPT | Performed by: FAMILY MEDICINE

## 2024-05-01 PROCEDURE — 3008F BODY MASS INDEX DOCD: CPT | Performed by: FAMILY MEDICINE

## 2024-05-01 NOTE — PROGRESS NOTES
[facility] Family Medicine Office Note  Chief Complaint:   Chief Complaint   Patient presents with    Physical       HPI:   This is a 53 year old male coming in for physical exam.  The patient states that he has been doing well denies any chest pain nausea vomiting diarrhea constipation.      Past Medical History:    Abdominal pain, unspecified site    Acute bronchitis    Acute bronchospasm    Acute pharyngitis    Acute sinusitis, unspecified    Bronchitis, not specified as acute or chronic    Bronchospasm    Chalazion    Diarrhea    Dysfunction of eustachian tube    Hemorrhage of rectum and anus    Lumbago    Molluscum contagiosum    Myocarditis (HCC)    Otalgia, unspecified    Unspecified hearing loss     Past Surgical History:   Procedure Laterality Date    Colonoscopy  2011    Other surgical history  9/9/13    [facility]/- Dr. Jernigan      Social History:  Social History     Socioeconomic History    Marital status: Single   Tobacco Use    Smoking status: Former     Types: Cigars    Smokeless tobacco: Never    Tobacco comments:     Non-Smoker   Vaping Use    Vaping status: Never Used   Substance and Sexual Activity    Alcohol use: Yes     Comment: occasiolnally    Drug use: Never    Sexual activity: Yes     Partners: Female   Other Topics Concern    Exercise Yes     Comment: occasional walk     Family History:  No family history on file.  Allergies:  No Known Allergies  Current Meds:  Current Outpatient Medications   Medication Sig Dispense Refill    valsartan 80 MG Oral Tab Take 1 tablet (80 mg total) by mouth daily. 90 tablet 1      Counseling given: Not Answered  Tobacco comments: Non-Smoker       REVIEW OF SYSTEMS:   Consitutional: No fevers, chills, sweats  Eye: No recent visual problems  ENMT: No ear pain nasal congestion sore throat  Respiratory: No shortness of breath, cough  Cardiovascular: No chest pain palpitations syncope  Gastrointestinal: No nausea vomiting diarrhea  Genitourinary: No  hematuria  Hema/Lymph no bruising tendency, swollen lymph glands  Endocrine: Negative for excessive thirst excessive hunger  Musculoskeletal: No back pain neck pain joint pain muscle pain decreased range of motion  Integumentary: No rash, pruritus, abrasions  Neurologic: Alert and oriented x4  Psychiatric: No anxiety, depression    Medical, surgical, family, and social histories were reviewed      EXAM:   VITALS:   Vitals:    05/01/24 0835   BP: 118/72   Pulse: 74   Resp: 18   Temp: 97.6 °F (36.4 °C)      GENERAL: well developed, well nourished, in no apparent distress  SKIN: no rashes, no suspicious lesions: Cool and Dry  HEENT: atraumatic, normocephalic, ears and throat are clear    Ears: TM's clear and visible bilaterally, no excess cerumen or erythema.   EYES: Pupils equal round and reactive.  Extraocular motions intact no scleral icterus no injection or drainage  THROAT without erythema tonsillar hypertrophy or exudate.  Uvula midline airway patent  NECK: Given midline.  No JVD or lymphadenopathy supple nontender no meningeal signs   LUNGS: clear to auscultation sounds equal bilaterally no wheezes rales or rhonchi  CARDIO: Regular rate and rhythm without murmurs gallops or rubs  GI: Soft nontender nondistended no hepatomegaly palpable masses.  No guarding.   without deformity or crepitus no flank tenderness  EXTREMITIES: no cyanosis, clubbing or edema no joint tenderness effusion or edema noted.  No calf tenderness negative Homans' sign bilaterally  NEURO: Awake and alert.  Cranial nerves II through XII intact motor and sensory grossly within normal limits.  5 out of 5 muscle strength in all muscle groups.  Normal speech.  PSYCHIATRIC: Awake, alert and oriented x3, cooperative appropriate mood and affect.  Judgment intact       ASSESSMENT AND PLAN:   1. Health maintenance examination  - CBC With Differential With Platelet; Future  - Comp Metabolic Panel (14); Future  - Lipid Panel; Future  - TSH W  Reflex To Free T4; Future  I did discuss with the patient at this time would suggest updating blood work you need a fasting lipid panel CBC CMP free T4 free T3 TSH as well as PSA.  We did also discuss for him to update a screening colonoscopy.  A referral was placed for this.  He was asked to follow-up yearly for regular physical exams or for any other acute concern.  2. Screening for malignant neoplasm of prostate  - PSA Total, Screen; Future    3. Screen for colon cancer  - Gastro Referral - In Network       Meds & Refills for this Visit:  Requested Prescriptions      No prescriptions requested or ordered in this encounter       Health Maintenance:  Health Maintenance Due   Topic Date Due    Colorectal Cancer Screening  Never done    PSA  05/06/2023    COVID-19 Vaccine (3 - 2023-24 season) 09/01/2023    Annual Physical  12/19/2023    Annual Depression Screening  01/01/2024       Patient/Caregiver Education: Patient/Caregiver Education: There are no barriers to learning. Medical education done.   Outcome: Patient verbalizes understanding. Patient is notified to call with any questions, complications, allergies, or worsening or changing symptoms.  Patient is to call with any side effects or complications from the treatments as a result of today.     Problem List:  Patient Active Problem List   Diagnosis    Unspecified hearing loss    Benign essential hypertension         No follow-ups on file.     Jame Ley MD    Please note that portions of this note may have been completed with a voice recognition program. Efforts were made to edit the dictations but occasionally words are mis-transcribed.

## 2024-09-23 ENCOUNTER — TELEPHONE (OUTPATIENT)
Dept: FAMILY MEDICINE CLINIC | Facility: CLINIC | Age: 54
End: 2024-09-23

## 2024-09-23 NOTE — TELEPHONE ENCOUNTER
Patient has been having slight tingling to 3 toes on right foot for over 6 months. Around that time, he remembers bending over to put pants on, had a sharp pain from back down right leg. Went to chiropractor and has been slightly better, however still with the residual tingling. No pain to toes. Patient has appointment 9/26/24.

## 2024-09-23 NOTE — TELEPHONE ENCOUNTER
Appointment For: Dustin Lizama ([id number])   Visit Type: MYCHART EXAM (2964)      9/26/2024    9:00 AM  15 mins.  Jame Ley         EMG [address]      Patient Comments:   The reason for the appointment is slight numbness with three toes on my right foot.     Ok to wait for evaluation?

## 2024-09-26 ENCOUNTER — LAB ENCOUNTER (OUTPATIENT)
Dept: LAB | Age: 54
End: 2024-09-26
Attending: FAMILY MEDICINE
Payer: COMMERCIAL

## 2024-09-26 ENCOUNTER — OFFICE VISIT (OUTPATIENT)
Dept: FAMILY MEDICINE CLINIC | Facility: CLINIC | Age: 54
End: 2024-09-26
Payer: COMMERCIAL

## 2024-09-26 VITALS
SYSTOLIC BLOOD PRESSURE: 116 MMHG | RESPIRATION RATE: 18 BRPM | HEIGHT: 70.98 IN | WEIGHT: 245 LBS | DIASTOLIC BLOOD PRESSURE: 80 MMHG | BODY MASS INDEX: 34.3 KG/M2 | HEART RATE: 80 BPM | TEMPERATURE: 98 F

## 2024-09-26 DIAGNOSIS — Z00.00 HEALTH MAINTENANCE EXAMINATION: ICD-10-CM

## 2024-09-26 DIAGNOSIS — Z23 ENCOUNTER FOR IMMUNIZATION: ICD-10-CM

## 2024-09-26 DIAGNOSIS — Z12.5 SCREENING FOR MALIGNANT NEOPLASM OF PROSTATE: ICD-10-CM

## 2024-09-26 DIAGNOSIS — R20.2 RIGHT LEG PARESTHESIAS: Primary | ICD-10-CM

## 2024-09-26 LAB
ALBUMIN SERPL-MCNC: 4.6 G/DL (ref 3.2–4.8)
ALBUMIN/GLOB SERPL: 1.6 {RATIO} (ref 1–2)
ALP LIVER SERPL-CCNC: 51 U/L
ALT SERPL-CCNC: 32 U/L
ANION GAP SERPL CALC-SCNC: 8 MMOL/L (ref 0–18)
AST SERPL-CCNC: 18 U/L (ref ?–34)
BASOPHILS # BLD AUTO: 0.06 X10(3) UL (ref 0–0.2)
BASOPHILS NFR BLD AUTO: 0.8 %
BILIRUB SERPL-MCNC: 0.6 MG/DL (ref 0.3–1.2)
BUN BLD-MCNC: 16 MG/DL (ref 9–23)
CALCIUM BLD-MCNC: 9.5 MG/DL (ref 8.7–10.4)
CHLORIDE SERPL-SCNC: 107 MMOL/L (ref 98–112)
CHOLEST SERPL-MCNC: 190 MG/DL (ref ?–200)
CO2 SERPL-SCNC: 25 MMOL/L (ref 21–32)
COMPLEXED PSA SERPL-MCNC: 1.42 NG/ML (ref ?–4)
CREAT BLD-MCNC: 1.15 MG/DL
EGFRCR SERPLBLD CKD-EPI 2021: 76 ML/MIN/1.73M2 (ref 60–?)
EOSINOPHIL # BLD AUTO: 0.22 X10(3) UL (ref 0–0.7)
EOSINOPHIL NFR BLD AUTO: 3 %
ERYTHROCYTE [DISTWIDTH] IN BLOOD BY AUTOMATED COUNT: 12.5 %
FASTING PATIENT LIPID ANSWER: YES
FASTING STATUS PATIENT QL REPORTED: YES
GLOBULIN PLAS-MCNC: 2.9 G/DL (ref 2–3.5)
GLUCOSE BLD-MCNC: 120 MG/DL (ref 70–99)
HCT VFR BLD AUTO: 46.4 %
HDLC SERPL-MCNC: 61 MG/DL (ref 40–59)
HGB BLD-MCNC: 15.7 G/DL
IMM GRANULOCYTES # BLD AUTO: 0.04 X10(3) UL (ref 0–1)
IMM GRANULOCYTES NFR BLD: 0.5 %
LDLC SERPL CALC-MCNC: 107 MG/DL (ref ?–100)
LYMPHOCYTES # BLD AUTO: 2 X10(3) UL (ref 1–4)
LYMPHOCYTES NFR BLD AUTO: 27.1 %
MCH RBC QN AUTO: 32.2 PG (ref 26–34)
MCHC RBC AUTO-ENTMCNC: 33.8 G/DL (ref 31–37)
MCV RBC AUTO: 95.3 FL
MONOCYTES # BLD AUTO: 0.62 X10(3) UL (ref 0.1–1)
MONOCYTES NFR BLD AUTO: 8.4 %
NEUTROPHILS # BLD AUTO: 4.43 X10 (3) UL (ref 1.5–7.7)
NEUTROPHILS # BLD AUTO: 4.43 X10(3) UL (ref 1.5–7.7)
NEUTROPHILS NFR BLD AUTO: 60.2 %
NONHDLC SERPL-MCNC: 129 MG/DL (ref ?–130)
OSMOLALITY SERPL CALC.SUM OF ELEC: 292 MOSM/KG (ref 275–295)
PLATELET # BLD AUTO: 335 10(3)UL (ref 150–450)
POTASSIUM SERPL-SCNC: 4.4 MMOL/L (ref 3.5–5.1)
PROT SERPL-MCNC: 7.5 G/DL (ref 5.7–8.2)
RBC # BLD AUTO: 4.87 X10(6)UL
SODIUM SERPL-SCNC: 140 MMOL/L (ref 136–145)
TRIGL SERPL-MCNC: 125 MG/DL (ref 30–149)
TSI SER-ACNC: 1.39 MIU/ML (ref 0.55–4.78)
VLDLC SERPL CALC-MCNC: 21 MG/DL (ref 0–30)
WBC # BLD AUTO: 7.4 X10(3) UL (ref 4–11)

## 2024-09-26 PROCEDURE — 84153 ASSAY OF PSA TOTAL: CPT | Performed by: FAMILY MEDICINE

## 2024-09-26 PROCEDURE — 3008F BODY MASS INDEX DOCD: CPT | Performed by: FAMILY MEDICINE

## 2024-09-26 PROCEDURE — 90471 IMMUNIZATION ADMIN: CPT | Performed by: FAMILY MEDICINE

## 2024-09-26 PROCEDURE — 99214 OFFICE O/P EST MOD 30 MIN: CPT | Performed by: FAMILY MEDICINE

## 2024-09-26 PROCEDURE — 80050 GENERAL HEALTH PANEL: CPT | Performed by: FAMILY MEDICINE

## 2024-09-26 PROCEDURE — 90715 TDAP VACCINE 7 YRS/> IM: CPT | Performed by: FAMILY MEDICINE

## 2024-09-26 PROCEDURE — 3074F SYST BP LT 130 MM HG: CPT | Performed by: FAMILY MEDICINE

## 2024-09-26 PROCEDURE — 80061 LIPID PANEL: CPT | Performed by: FAMILY MEDICINE

## 2024-09-26 PROCEDURE — 3079F DIAST BP 80-89 MM HG: CPT | Performed by: FAMILY MEDICINE

## 2024-09-26 RX ORDER — PREDNISONE 50 MG/1
50 TABLET ORAL DAILY
Qty: 7 TABLET | Refills: 0 | Status: SHIPPED | OUTPATIENT
Start: 2024-09-26 | End: 2024-10-03

## 2024-09-26 RX ORDER — DICLOFENAC SODIUM 75 MG/1
75 TABLET, DELAYED RELEASE ORAL 2 TIMES DAILY PRN
Qty: 20 TABLET | Refills: 0 | Status: SHIPPED | OUTPATIENT
Start: 2024-09-26

## 2024-09-26 NOTE — PROGRESS NOTES
[facility] Family Medicine Office Note  Chief Complaint:   Chief Complaint   Patient presents with    Toe Pain     Numbness in toes     Leg Pain     Lower calf pain        HPI:   This is a 53 year old male coming in for numbness of his right calf as well as numbness of his 3 through fifth toes.  The patient states that this has been going on for the last couple months.  He has not had any trauma or injury to the area states that he feels at times there is a charley horse present of the right calf.      Past Medical History:    Abdominal pain, unspecified site    Acute bronchitis    Acute bronchospasm    Acute pharyngitis    Acute sinusitis, unspecified    Bronchitis, not specified as acute or chronic    Bronchospasm    Chalazion    Diarrhea    Dysfunction of eustachian tube    Hemorrhage of rectum and anus    Lumbago    Molluscum contagiosum    Myocarditis (HCC)    Otalgia, unspecified    Unspecified hearing loss     Past Surgical History:   Procedure Laterality Date    Colonoscopy  2011    Other surgical history  9/9/13    [facility]/- Dr. Jernigan      Social History:  Social History     Socioeconomic History    Marital status: Single   Tobacco Use    Smoking status: Former     Types: Cigars    Smokeless tobacco: Never    Tobacco comments:     Non-Smoker   Vaping Use    Vaping status: Never Used   Substance and Sexual Activity    Alcohol use: Yes     Comment: occasiolnally    Drug use: Never    Sexual activity: Yes     Partners: Female   Other Topics Concern    Exercise Yes     Comment: occasional walk     Family History:  No family history on file.  Allergies:  No Known Allergies  Current Meds:  Current Outpatient Medications   Medication Sig Dispense Refill    predniSONE 50 MG Oral Tab Take 1 tablet (50 mg total) by mouth daily for 7 days. 7 tablet 0    diclofenac 75 MG Oral Tab EC Take 1 tablet (75 mg total) by mouth 2 (two) times daily as needed. 20 tablet 0    PEG 3350-KCl-Na Bicarb-NaCl 420 g Oral Recon  Soln Take as directed by physician 4000 mL 0    valsartan 80 MG Oral Tab Take 1 tablet (80 mg total) by mouth daily. 90 tablet 1      Counseling given: Not Answered  Tobacco comments: Non-Smoker       REVIEW OF SYSTEMS:   Consitutional: No fevers, chills, sweats  Eye: No recent visual problems  ENMT: No ear pain nasal congestion sore throat  Respiratory: No shortness of breath, cough  Cardiovascular: No chest pain palpitations syncope  Gastrointestinal: No nausea vomiting diarrhea  Genitourinary: No hematuria  Hema/Lymph no bruising tendency, swollen lymph glands  Endocrine: Negative for excessive thirst excessive hunger  Musculoskeletal: No back pain neck pain joint pain muscle pain decreased range of motion  Positive numbness right calf    Medical, surgical, family, and social histories were reviewed      EXAM:   VITALS:   Vitals:    09/26/24 0801   BP: 116/80   Pulse: 80   Resp: 18   Temp: 98 °F (36.7 °C)      GENERAL: well developed, well nourished, in no apparent distress  SKIN: no rashes, no suspicious lesions: Cool and Dry  HEENT: atraumatic, normocephalic, ears and throat are clear    Ears: TM's clear and visible bilaterally, no excess cerumen or erythema.   EYES: Pupils equal round and reactive.  Extraocular motions intact no scleral icterus no injection or drainage  THROAT without erythema tonsillar hypertrophy or exudate.  Uvula midline airway patent  NECK: Given midline.  No JVD or lymphadenopathy supple nontender no meningeal signs   LUNGS: clear to auscultation sounds equal bilaterally no wheezes rales or rhonchi  CARDIO: Regular rate and rhythm without murmurs gallops or rubs  GI: Soft nontender nondistended no hepatomegaly palpable masses.  No guarding.   without deformity or crepitus no flank tenderness  EXTREMITIES: no cyanosis, clubbing or edema no joint tenderness effusion or edema noted.  No calf tenderness negative Homans' sign bilaterally      ASSESSMENT AND PLAN:   1. Right leg  paresthesias  - predniSONE 50 MG Oral Tab; Take 1 tablet (50 mg total) by mouth daily for 7 days.  Dispense: 7 tablet; Refill: 0  - diclofenac 75 MG Oral Tab EC; Take 1 tablet (75 mg total) by mouth 2 (two) times daily as needed.  Dispense: 20 tablet; Refill: 0  - EMG (at [facility]); Future  I did discuss with the patient at this time would suggest doing a steroid burst she was asked to use prednisone once a day for next 7 days as well as diclofenac as needed.  I did discuss would like for him to have an EMG completed as well this could be tarsal tunnel syndrome.  2. Encounter for immunization  - TETANUS, DIPHTHERIA TOXOIDS AND ACELLULAR PERTUSIS VACCINE (TDAP), >7 YEARS, IM USE  Did discuss with the patient would suggest updating his immunizations he was asked to have a tetanus vaccine completed today.    Meds & Refills for this Visit:  Requested Prescriptions     Signed Prescriptions Disp Refills    predniSONE 50 MG Oral Tab 7 tablet 0     Sig: Take 1 tablet (50 mg total) by mouth daily for 7 days.    diclofenac 75 MG Oral Tab EC 20 tablet 0     Sig: Take 1 tablet (75 mg total) by mouth 2 (two) times daily as needed.       Health Maintenance:  Health Maintenance Due   Topic Date Due    Colorectal Cancer Screening  Never done    Zoster Vaccines (1 of 2) Never done    PSA  05/06/2023    Annual Depression Screening  01/01/2024    COVID-19 Vaccine (3 - 2023-24 season) 09/01/2024       Patient/Caregiver Education: Patient/Caregiver Education: There are no barriers to learning. Medical education done.   Outcome: Patient verbalizes understanding. Patient is notified to call with any questions, complications, allergies, or worsening or changing symptoms.  Patient is to call with any side effects or complications from the treatments as a result of today.     Problem List:  Patient Active Problem List   Diagnosis    Unspecified hearing loss    Benign essential hypertension         No follow-ups on file.     Jame TAFOYA  MD Av    Please note that portions of this note may have been completed with a voice recognition program. Efforts were made to edit the dictations but occasionally words are mis-transcribed.

## 2024-10-28 ENCOUNTER — APPOINTMENT (OUTPATIENT)
Dept: GENERAL RADIOLOGY | Age: 54
End: 2024-10-28
Attending: EMERGENCY MEDICINE
Payer: COMMERCIAL

## 2024-10-28 ENCOUNTER — HOSPITAL ENCOUNTER (EMERGENCY)
Age: 54
Discharge: HOME | End: 2024-10-28
Attending: EMERGENCY MEDICINE
Payer: COMMERCIAL

## 2024-10-28 VITALS
TEMPERATURE: 100 F | HEIGHT: 71 IN | SYSTOLIC BLOOD PRESSURE: 122 MMHG | OXYGEN SATURATION: 95 % | HEART RATE: 106 BPM | DIASTOLIC BLOOD PRESSURE: 77 MMHG | WEIGHT: 245 LBS | RESPIRATION RATE: 18 BRPM | BODY MASS INDEX: 34.3 KG/M2

## 2024-10-28 DIAGNOSIS — J18.9 PNEUMONIA OF LEFT LOWER LOBE DUE TO INFECTIOUS ORGANISM: Primary | ICD-10-CM

## 2024-10-28 LAB
POCT INFLUENZA A: NEGATIVE
POCT INFLUENZA B: NEGATIVE
SARS-COV-2 RNA RESP QL NAA+PROBE: NOT DETECTED

## 2024-10-28 PROCEDURE — 87502 INFLUENZA DNA AMP PROBE: CPT

## 2024-10-28 PROCEDURE — 99284 EMERGENCY DEPT VISIT MOD MDM: CPT

## 2024-10-28 PROCEDURE — 71045 X-RAY EXAM CHEST 1 VIEW: CPT | Performed by: EMERGENCY MEDICINE

## 2024-10-28 PROCEDURE — 87502 INFLUENZA DNA AMP PROBE: CPT | Performed by: EMERGENCY MEDICINE

## 2024-10-28 RX ORDER — AMOXICILLIN 500 MG/1
1000 TABLET, FILM COATED ORAL 3 TIMES DAILY
Qty: 42 TABLET | Refills: 0 | Status: SHIPPED | OUTPATIENT
Start: 2024-10-28 | End: 2024-10-28

## 2024-10-28 RX ORDER — AMOXICILLIN 500 MG/1
1000 CAPSULE ORAL ONCE
Status: COMPLETED | OUTPATIENT
Start: 2024-10-28 | End: 2024-10-28

## 2024-10-28 RX ORDER — AZITHROMYCIN 250 MG/1
500 TABLET, FILM COATED ORAL ONCE
Status: COMPLETED | OUTPATIENT
Start: 2024-10-28 | End: 2024-10-28

## 2024-10-28 RX ORDER — ACETAMINOPHEN 500 MG
1000 TABLET ORAL ONCE
Status: COMPLETED | OUTPATIENT
Start: 2024-10-28 | End: 2024-10-28

## 2024-10-28 RX ORDER — AMOXICILLIN 500 MG/1
1000 TABLET, FILM COATED ORAL 3 TIMES DAILY
Qty: 30 TABLET | Refills: 0 | Status: SHIPPED | OUTPATIENT
Start: 2024-10-28 | End: 2024-11-02

## 2024-10-28 RX ORDER — AZITHROMYCIN 500 MG/1
500 TABLET, FILM COATED ORAL DAILY
Qty: 3 TABLET | Refills: 0 | Status: SHIPPED | OUTPATIENT
Start: 2024-10-28 | End: 2024-10-28

## 2024-10-28 RX ORDER — ALBUTEROL SULFATE 90 UG/1
2 INHALANT RESPIRATORY (INHALATION) EVERY 4 HOURS PRN
Qty: 1 EACH | Refills: 0 | Status: SHIPPED | OUTPATIENT
Start: 2024-10-28 | End: 2024-11-27

## 2024-10-28 RX ORDER — AZITHROMYCIN 250 MG/1
TABLET, FILM COATED ORAL
Qty: 6 TABLET | Refills: 0 | Status: SHIPPED | OUTPATIENT
Start: 2024-10-28 | End: 2024-11-02

## 2024-10-28 RX ORDER — IBUPROFEN 600 MG/1
600 TABLET, FILM COATED ORAL ONCE
Status: COMPLETED | OUTPATIENT
Start: 2024-10-28 | End: 2024-10-28

## 2024-10-29 NOTE — ED PROVIDER NOTES
Patient Seen in: [facility] In [place]      History     Chief Complaint   Patient presents with    Cough/URI    Fever     Stated Complaint: Pt wtih c/o cough and fever that started last night. Pt was supposed to have co*    Subjective:   HPI      53-year-old male.  Patient was scheduled for colonoscopy today.  He had completed his preop and drink his prep solution.  However, as he was preparing to have the procedure, he began to develop a little malaise and dry cough.  The cough was noticed and the patient's temperature was taken.  He had a fever of 101.2.  His procedure was canceled.  Throughout the day, he has continued to have dry cough, malaise and fever.    Objective:     Past Medical History:    Abdominal pain, unspecified site    Acute bronchitis    Acute bronchospasm    Acute pharyngitis    Acute sinusitis, unspecified    Bronchitis, not specified as acute or chronic    Bronchospasm    Chalazion    Decorative tattoo    Diarrhea    Dysfunction of eustachian tube    Hemorrhage of rectum and anus    Lumbago    Molluscum contagiosum    Myocarditis (HCC)    Otalgia, unspecified    Unspecified hearing loss    Wears glasses              Past Surgical History:   Procedure Laterality Date    Colonoscopy  01/01/2011    Hernia surgery      Other surgical history  09/09/2013    Flow/- Dr. Jernigan                 Social History     Socioeconomic History    Marital status: Single   Tobacco Use    Smoking status: Former     Types: Cigars    Smokeless tobacco: Never    Tobacco comments:     Non-Smoker   Vaping Use    Vaping status: Never Used   Substance and Sexual Activity    Alcohol use: Yes     Alcohol/week: 4.0 standard drinks of alcohol     Types: 2 Glasses of wine, 2 Cans of beer per week     Comment: occasiolnally    Drug use: Never    Sexual activity: Yes     Partners: Female   Other Topics Concern    Exercise Yes     Comment: occasional walk                  Physical Exam     ED Triage Vitals  [10/28/24 2120]   BP (!) 150/91   Pulse 108   Resp 16   Temp 100.2 °F (37.9 °C)   Temp src    SpO2 93 %   O2 Device None (Room air)       Current Vitals:   Vital Signs  BP: (!) 150/91  Pulse: 108  Resp: 16  Temp: 100.2 °F (37.9 °C)    Oxygen Therapy  SpO2: 93 %  O2 Device: None (Room air)        Physical Exam     Gen: Well appearing, well groomed, alert and aware x 3  Neck: Supple, full range of motion, no thyromegaly or lymphadenopathy.  Eye examination: EOMs are intact, normal conjunctival  ENT: No injection noted to the bilateral auditory canals; no loss of landmarks. Normal nasal mucosa without audible nasal congestion.  Oropharynx is patent without evidence of erythema, exudates or deviation.  No stridor to auscultation  Lung: No distress, RR, no retraction, breath sounds are clear bilaterally  Cardio: Regular rate and rhythm, normal S1-S2, no murmur appreciable  Skin: No sign of trauma, Skin warm and dry, no induration or sign of infection.  No rash noted    ED Course     Labs Reviewed   POCT FLU TEST - Normal    Narrative:     This assay is a rapid molecular in vitro test utilizing nucleic acid amplification of influenza A and B viral RNA.   RAPID SARS-COV-2 BY PCR - Normal          XR CHEST AP PORTABLE  (CPT=71045)    Result Date: 10/28/2024  PROCEDURE:  XR CHEST AP PORTABLE  (CPT=71045)  TECHNIQUE:  AP chest radiograph was obtained.  COMPARISON:  [facility], XR, CHEST PA   LATERAL, 4/04/2012, 3:44 PM.  INDICATIONS:  Pt wtih c/o cough and fever that started last night. Pt was supposed to have colonoscopy today but was told due to cough and fever procedure couldn't be done.  PATIENT STATED HISTORY: (As transcribed by Technologist)  Patient has a cough and fever that started last night. Patient took colonoscopy prep last night as well. Patient stated his cough started dry and now is wet. Patient stated to have chest congestion as well.    FINDINGS:  Bibasilar airspace opacities are present.   No pleural effusion.  No measurable pneumothorax.  Mildly prominent cardiac silhouette.  No acute osseous findings.            CONCLUSION:  Bibasilar airspace opacities are present, which may be related to atelectasis, with superimposed infection/inflammation not excluded.   LOCATION:  [facility]      Dictated by (CST): Dwayne Mckeon MD on 10/28/2024 at 10:42 PM     Finalized by (CST): Dwayne Mckeon MD on 10/28/2024 at 10:44 PM               MDM      My supervising physician was involved in the management of this patient.    Influenza and COVID were performed in triage.  Both negative.  However, patient is less than 24 hours of symptoms.     CONCLUSION:  Bibasilar airspace opacities are present, which may be related to atelectasis, with superimposed infection/inflammation not excluded.   LOCATION:  [facility]      Dictated by (CST): Dwayne Mckeon MD on 10/28/2024 at 10:42 PM     Finalized by (CST): Dwayne Mckeon MD on 10/28/2024 at 10:44 PM        Chest x-ray as above.  Self read demonstrates potential left lower lobe pneumonia.  We will cover with azithromycin plus amoxicillin combination with close follow-up primary care physician    Medical Decision Making      Disposition and Plan     Clinical Impression:  1. Pneumonia of left lower lobe due to infectious organism         Disposition:  Discharge  10/28/2024 10:48 pm    Follow-up:  Jame Ley MD  [address] [place] [id number]  [phone]    Follow up            Medications Prescribed:  Current Discharge Medication List        START taking these medications    Details   amoxicillin 500 MG Oral Tab Take 2 tablets (1,000 mg total) by mouth 3 (three) times daily for 7 days.  Qty: 42 tablet, Refills: 0      azithromycin 500 MG Oral Tab Take 1 tablet (500 mg total) by mouth daily for 3 days.  Qty: 3 tablet, Refills: 0                 Supplementary Documentation:

## 2024-10-29 NOTE — ED INITIAL ASSESSMENT (HPI)
Cough started last night. States went in for a colonoscopy today and when they took his temp he had a fever and they canceled his procedure. States cough has become productive throughout the day.

## 2024-10-29 NOTE — DISCHARGE INSTRUCTIONS
Antibiotics as written.  2 puffs of inhaler every 3-4 hours as needed.  Push clear fluids.  For any worsening occluding acute chest pain or shortness of breath please immediately seek reevaluation

## 2024-11-06 ENCOUNTER — PATIENT MESSAGE (OUTPATIENT)
Dept: FAMILY MEDICINE CLINIC | Facility: CLINIC | Age: 54
End: 2024-11-06

## 2024-12-17 PROBLEM — Z12.11 SPECIAL SCREENING FOR MALIGNANT NEOPLASM OF COLON: Status: ACTIVE | Noted: 2024-12-17

## 2024-12-17 PROBLEM — D12.0 BENIGN NEOPLASM OF CECUM: Status: ACTIVE | Noted: 2024-12-17

## 2024-12-17 PROBLEM — D12.3 BENIGN NEOPLASM OF TRANSVERSE COLON: Status: ACTIVE | Noted: 2024-12-17

## 2025-02-13 ENCOUNTER — PATIENT MESSAGE (OUTPATIENT)
Dept: FAMILY MEDICINE CLINIC | Facility: CLINIC | Age: 55
End: 2025-02-13

## 2025-02-13 NOTE — TELEPHONE ENCOUNTER
LOV: 9/26/24    NOV: None scheduled    Requesting refill:  valsartan 80 MG     Last refill: 8/21/23 #90 1 refill       Please see pended Rx for your approval. Thank you.

## 2025-02-14 RX ORDER — VALSARTAN 80 MG/1
80 TABLET ORAL DAILY
Qty: 90 TABLET | Refills: 0 | Status: SHIPPED | OUTPATIENT
Start: 2025-02-14

## 2025-04-30 ENCOUNTER — PATIENT MESSAGE (OUTPATIENT)
Dept: FAMILY MEDICINE CLINIC | Facility: CLINIC | Age: 55
End: 2025-04-30

## 2025-04-30 NOTE — TELEPHONE ENCOUNTER
Called patient back and informed him the EMG was placed on 9/26/2024 and it should be acceptable.  Informed him that the order the extremity to do is his right leg and not sure how high or how low they get to, but advised patient to clarify with the .  Patient voiced understanding and agreed with plan.

## 2025-05-22 DIAGNOSIS — Z12.5 SCREENING FOR MALIGNANT NEOPLASM OF PROSTATE: ICD-10-CM

## 2025-05-22 DIAGNOSIS — Z00.00 HEALTHCARE MAINTENANCE: Primary | ICD-10-CM

## 2025-06-09 RX ORDER — VALSARTAN 80 MG/1
80 TABLET ORAL DAILY
Qty: 30 TABLET | Refills: 0 | OUTPATIENT
Start: 2025-06-09

## 2025-06-24 ENCOUNTER — PATIENT MESSAGE (OUTPATIENT)
Dept: FAMILY MEDICINE CLINIC | Facility: CLINIC | Age: 55
End: 2025-06-24

## 2025-06-25 ENCOUNTER — LAB ENCOUNTER (OUTPATIENT)
Dept: LAB | Age: 55
End: 2025-06-25
Attending: FAMILY MEDICINE
Payer: COMMERCIAL

## 2025-06-25 ENCOUNTER — OFFICE VISIT (OUTPATIENT)
Dept: FAMILY MEDICINE CLINIC | Facility: CLINIC | Age: 55
End: 2025-06-25
Payer: COMMERCIAL

## 2025-06-25 VITALS
WEIGHT: 251 LBS | HEART RATE: 64 BPM | DIASTOLIC BLOOD PRESSURE: 78 MMHG | HEIGHT: 71 IN | SYSTOLIC BLOOD PRESSURE: 124 MMHG | TEMPERATURE: 98 F | BODY MASS INDEX: 35.14 KG/M2 | OXYGEN SATURATION: 97 %

## 2025-06-25 DIAGNOSIS — Z00.00 HEALTHCARE MAINTENANCE: ICD-10-CM

## 2025-06-25 DIAGNOSIS — Z12.5 SCREENING FOR MALIGNANT NEOPLASM OF PROSTATE: ICD-10-CM

## 2025-06-25 DIAGNOSIS — I10 BENIGN ESSENTIAL HYPERTENSION: ICD-10-CM

## 2025-06-25 LAB
ALBUMIN SERPL-MCNC: 4.5 G/DL (ref 3.2–4.8)
ALBUMIN/GLOB SERPL: 1.6 {RATIO} (ref 1–2)
ALP LIVER SERPL-CCNC: 46 U/L (ref 45–117)
ALT SERPL-CCNC: 35 U/L (ref 10–49)
ANION GAP SERPL CALC-SCNC: 9 MMOL/L (ref 0–18)
AST SERPL-CCNC: 24 U/L (ref ?–34)
BASOPHILS # BLD AUTO: 0.06 X10(3) UL (ref 0–0.2)
BASOPHILS NFR BLD AUTO: 0.9 %
BILIRUB SERPL-MCNC: 0.5 MG/DL (ref 0.3–1.2)
BUN BLD-MCNC: 16 MG/DL (ref 9–23)
CALCIUM BLD-MCNC: 9.4 MG/DL (ref 8.7–10.6)
CHLORIDE SERPL-SCNC: 105 MMOL/L (ref 98–112)
CHOLEST SERPL-MCNC: 183 MG/DL (ref ?–200)
CO2 SERPL-SCNC: 27 MMOL/L (ref 21–32)
COMPLEXED PSA SERPL-MCNC: 0.65 NG/ML (ref ?–4)
CREAT BLD-MCNC: 1.29 MG/DL (ref 0.7–1.3)
EGFRCR SERPLBLD CKD-EPI 2021: 65 ML/MIN/1.73M2 (ref 60–?)
EOSINOPHIL # BLD AUTO: 0.21 X10(3) UL (ref 0–0.7)
EOSINOPHIL NFR BLD AUTO: 3.2 %
ERYTHROCYTE [DISTWIDTH] IN BLOOD BY AUTOMATED COUNT: 12.5 %
FASTING PATIENT LIPID ANSWER: YES
FASTING STATUS PATIENT QL REPORTED: YES
GLOBULIN PLAS-MCNC: 2.8 G/DL (ref 2–3.5)
GLUCOSE BLD-MCNC: 105 MG/DL (ref 70–99)
HCT VFR BLD AUTO: 44.2 % (ref 39–53)
HDLC SERPL-MCNC: 62 MG/DL (ref 40–59)
HGB BLD-MCNC: 15.2 G/DL (ref 13–17.5)
IMM GRANULOCYTES # BLD AUTO: 0.02 X10(3) UL (ref 0–1)
IMM GRANULOCYTES NFR BLD: 0.3 %
LDLC SERPL CALC-MCNC: 106 MG/DL (ref ?–100)
LYMPHOCYTES # BLD AUTO: 2.35 X10(3) UL (ref 1–4)
LYMPHOCYTES NFR BLD AUTO: 35.6 %
MCH RBC QN AUTO: 32.8 PG (ref 26–34)
MCHC RBC AUTO-ENTMCNC: 34.4 G/DL (ref 31–37)
MCV RBC AUTO: 95.5 FL (ref 80–100)
MONOCYTES # BLD AUTO: 0.56 X10(3) UL (ref 0.1–1)
MONOCYTES NFR BLD AUTO: 8.5 %
NEUTROPHILS # BLD AUTO: 3.4 X10 (3) UL (ref 1.5–7.7)
NEUTROPHILS # BLD AUTO: 3.4 X10(3) UL (ref 1.5–7.7)
NEUTROPHILS NFR BLD AUTO: 51.5 %
NONHDLC SERPL-MCNC: 121 MG/DL (ref ?–130)
OSMOLALITY SERPL CALC.SUM OF ELEC: 294 MOSM/KG (ref 275–295)
PLATELET # BLD AUTO: 322 10(3)UL (ref 150–450)
POTASSIUM SERPL-SCNC: 4.5 MMOL/L (ref 3.5–5.1)
PROT SERPL-MCNC: 7.3 G/DL (ref 5.7–8.2)
RBC # BLD AUTO: 4.63 X10(6)UL (ref 4.3–5.7)
SODIUM SERPL-SCNC: 141 MMOL/L (ref 136–145)
TRIGL SERPL-MCNC: 84 MG/DL (ref 30–149)
TSI SER-ACNC: 1.4 UIU/ML (ref 0.55–4.78)
VLDLC SERPL CALC-MCNC: 14 MG/DL (ref 0–30)
WBC # BLD AUTO: 6.6 X10(3) UL (ref 4–11)

## 2025-06-25 PROCEDURE — 80061 LIPID PANEL: CPT | Performed by: FAMILY MEDICINE

## 2025-06-25 PROCEDURE — 84153 ASSAY OF PSA TOTAL: CPT | Performed by: FAMILY MEDICINE

## 2025-06-25 PROCEDURE — 80050 GENERAL HEALTH PANEL: CPT | Performed by: FAMILY MEDICINE

## 2025-06-25 RX ORDER — VALSARTAN 80 MG/1
80 TABLET ORAL DAILY
Qty: 90 TABLET | Refills: 0 | Status: SHIPPED | OUTPATIENT
Start: 2025-06-25

## 2025-06-25 NOTE — PROGRESS NOTES
[facility] Family [facility] Office Note  Chief Complaint:   Chief Complaint   Patient presents with    Physical     Annual px  Declined vaccines       HPI:   This is a 54 year old male coming in for physical exam.  The patient denies any acute concerns today.  States that he has been doing well.  Has been taking his medications as prescribed.  Does have a history of hypertension.  Denies any chest pain nausea vomiting diarrhea constipation.      Past Medical History[1]  Past Surgical History[2]  Social History:  Short Social Hx on File[3]  Family History:  Family History[4]  Allergies:  Allergies[5]  Current Meds:  Current Medications[6]   Counseling given: Not Answered  Tobacco comments: Non-Smoker       REVIEW OF SYSTEMS:   Consitutional: No fevers, chills, sweats  Eye: No recent visual problems  ENMT: No ear pain nasal congestion sore throat  Respiratory: No shortness of breath, cough  Cardiovascular: No chest pain palpitations syncope  Gastrointestinal: No nausea vomiting diarrhea  Genitourinary: No hematuria  Hema/Lymph no bruising tendency, swollen lymph glands  Endocrine: Negative for excessive thirst excessive hunger  Musculoskeletal: No back pain neck pain joint pain muscle pain decreased range of motion  Integumentary: No rash, pruritus, abrasions  Neurologic: Alert and oriented x4  Psychiatric: No anxiety, depression    Medical, surgical, family, and social histories were reviewed      EXAM:   VITALS:   Vitals:    06/25/25 0919   BP: 124/78   Pulse: 64   Temp: 97.5 °F (36.4 °C)      GENERAL: well developed, well nourished, in no apparent distress  SKIN: no rashes, no suspicious lesions: Cool and Dry  HEENT: atraumatic, normocephalic, ears and throat are clear    Ears: TM's clear and visible bilaterally, no excess cerumen or erythema.   EYES: Pupils equal round and reactive.  Extraocular motions intact no scleral icterus no injection or drainage  THROAT without erythema tonsillar hypertrophy or  exudate.  Uvula midline airway patent  NECK: Given midline.  No JVD or lymphadenopathy supple nontender no meningeal signs   LUNGS: clear to auscultation sounds equal bilaterally no wheezes rales or rhonchi  CARDIO: Regular rate and rhythm without murmurs gallops or rubs  GI: Soft nontender nondistended no hepatomegaly palpable masses.  No guarding.   without deformity or crepitus no flank tenderness  EXTREMITIES: no cyanosis, clubbing or edema no joint tenderness effusion or edema noted.  No calf tenderness negative Homans' sign bilaterally  Prostate exam deferred  NEURO: Awake and alert.  Cranial nerves II through XII intact motor and sensory grossly within normal limits.  5 out of 5 muscle strength in all muscle groups.  Normal speech.  PSYCHIATRIC: Awake, alert and oriented x3, cooperative appropriate mood and affect.  Judgment intact       ASSESSMENT AND PLAN:   1. Healthcare maintenance  I did discuss with the patient at this time I would suggest for her to consider updating a Shingrix vaccine as well as pneumonia vaccine.  The patient recently did have blood work completed once we have the results we will go over the make any further recommendations.  He was asked to follow-up yearly for regular physical exams.  2. Benign essential hypertension  - valsartan 80 MG Oral Tab; Take 1 tablet (80 mg total) by mouth daily.  Dispense: 90 tablet; Refill: 0  Patient's blood pressure has been well-controlled he is currently on valsartan.  He was asked to continue with the medication as prescribed he was given refills.    Meds & Refills for this Visit:  Requested Prescriptions     Signed Prescriptions Disp Refills    valsartan 80 MG Oral Tab 90 tablet 0     Sig: Take 1 tablet (80 mg total) by mouth daily.       Health Maintenance:  Health Maintenance Due   Topic Date Due    Pneumococcal Vaccine: 50+ Years (1 of 1 - PCV) Never done    Zoster Vaccines (1 of 2) Never done    COVID-19 Vaccine (3 - 2024-25 season)  09/01/2024    Annual Physical  05/01/2025       Patient/Caregiver Education: Patient/Caregiver Education: There are no barriers to learning. Medical education done.   Outcome: Patient verbalizes understanding. Patient is notified to call with any questions, complications, allergies, or worsening or changing symptoms.  Patient is to call with any side effects or complications from the treatments as a result of today.     Problem List:  Problem List[7]      No follow-ups on file.     Jame Ley MD    Please note that portions of this note may have been completed with a voice recognition program. Efforts were made to edit the dictations but occasionally words are mis-transcribed.       [1]   Past Medical History:   Abdominal hernia    Had a hernia procedure over 10 years ago    Abdominal pain, unspecified site    Acute bronchitis    Acute bronchospasm    Acute pharyngitis    Acute sinusitis, unspecified    Bronchitis, not specified as acute or chronic    Bronchospasm    Chalazion    Decorative tattoo    Diarrhea    Dysfunction of eustachian tube    Hearing loss    Hemorrhage of rectum and anus    Lumbago    Molluscum contagiosum    Myocarditis (HCC)    Otalgia, unspecified    Unspecified hearing loss    Wears glasses   [2]   Past Surgical History:  Procedure Laterality Date    Colonoscopy  01/01/2011    Hernia surgery      Other surgical history  09/09/2013    Flow/- Dr. Jernigan    [3]   Social History  Socioeconomic History    Marital status: Single   Tobacco Use    Smoking status: Former     Types: Cigars    Smokeless tobacco: Never    Tobacco comments:     Non-Smoker   Vaping Use    Vaping status: Never Used   Substance and Sexual Activity    Alcohol use: Yes     Alcohol/week: 4.0 standard drinks of alcohol     Types: 2 Glasses of wine, 2 Cans of beer per week     Comment: occasiolnally    Drug use: Never    Sexual activity: Yes     Partners: Female   Other Topics Concern    Exercise Yes     Comment:  occasional walk     Social Drivers of Health     Food Insecurity: No Food Insecurity (6/25/2025)    NCSS - Food Insecurity     Worried About Running Out of Food in the Last Year: No     Ran Out of Food in the Last Year: No   Transportation Needs: No Transportation Needs (6/25/2025)    NCSS - Transportation     Lack of Transportation: No   Housing Stability: Not At Risk (6/25/2025)    NCSS - Housing/Utilities     Has Housing: Yes     Worried About Losing Housing: No     Unable to Get Utilities: No   [4]   Family History  Problem Relation Age of Onset    Heart Attack Father    [5] No Known Allergies  [6]   Current Outpatient Medications   Medication Sig Dispense Refill    valsartan 80 MG Oral Tab Take 1 tablet (80 mg total) by mouth daily. 90 tablet 0   [7]   Patient Active Problem List  Diagnosis    Unspecified hearing loss    Benign essential hypertension    Special screening for malignant neoplasm of colon    Benign neoplasm of cecum    Benign neoplasm of transverse colon